# Patient Record
Sex: FEMALE | Race: BLACK OR AFRICAN AMERICAN | NOT HISPANIC OR LATINO | Employment: FULL TIME | URBAN - METROPOLITAN AREA
[De-identification: names, ages, dates, MRNs, and addresses within clinical notes are randomized per-mention and may not be internally consistent; named-entity substitution may affect disease eponyms.]

---

## 2023-03-15 DIAGNOSIS — I10 PRIMARY HYPERTENSION: Primary | ICD-10-CM

## 2023-03-15 RX ORDER — LISINOPRIL 10 MG/1
TABLET ORAL
Qty: 30 TABLET | Refills: 0 | Status: SHIPPED | OUTPATIENT
Start: 2023-03-15 | End: 2023-07-18 | Stop reason: SDUPTHER

## 2023-03-15 RX ORDER — HYDROCHLOROTHIAZIDE 12.5 MG/1
TABLET ORAL
Qty: 30 TABLET | Refills: 0 | Status: SHIPPED | OUTPATIENT
Start: 2023-03-15 | End: 2023-07-18 | Stop reason: SDUPTHER

## 2023-07-13 ENCOUNTER — APPOINTMENT (OUTPATIENT)
Dept: PRIMARY CARE | Facility: CLINIC | Age: 53
End: 2023-07-13
Payer: COMMERCIAL

## 2023-07-18 ENCOUNTER — OFFICE VISIT (OUTPATIENT)
Dept: PRIMARY CARE | Facility: CLINIC | Age: 53
End: 2023-07-18
Payer: COMMERCIAL

## 2023-07-18 VITALS
TEMPERATURE: 98.1 F | OXYGEN SATURATION: 98 % | HEART RATE: 64 BPM | DIASTOLIC BLOOD PRESSURE: 98 MMHG | SYSTOLIC BLOOD PRESSURE: 160 MMHG | WEIGHT: 119 LBS | RESPIRATION RATE: 17 BRPM | BODY MASS INDEX: 21.77 KG/M2

## 2023-07-18 DIAGNOSIS — I10 PRIMARY HYPERTENSION: ICD-10-CM

## 2023-07-18 DIAGNOSIS — E78.49 OTHER HYPERLIPIDEMIA: Primary | ICD-10-CM

## 2023-07-18 LAB
ALANINE AMINOTRANSFERASE (SGPT) (U/L) IN SER/PLAS: 11 U/L (ref 7–45)
ALBUMIN (G/DL) IN SER/PLAS: 4.6 G/DL (ref 3.4–5)
ALKALINE PHOSPHATASE (U/L) IN SER/PLAS: 77 U/L (ref 33–110)
ANION GAP IN SER/PLAS: 11 MMOL/L (ref 10–20)
ASPARTATE AMINOTRANSFERASE (SGOT) (U/L) IN SER/PLAS: 19 U/L (ref 9–39)
BILIRUBIN TOTAL (MG/DL) IN SER/PLAS: 0.7 MG/DL (ref 0–1.2)
CALCIUM (MG/DL) IN SER/PLAS: 9.9 MG/DL (ref 8.6–10.6)
CARBON DIOXIDE, TOTAL (MMOL/L) IN SER/PLAS: 32 MMOL/L (ref 21–32)
CHLORIDE (MMOL/L) IN SER/PLAS: 104 MMOL/L (ref 98–107)
CHOLESTEROL (MG/DL) IN SER/PLAS: 211 MG/DL (ref 0–199)
CHOLESTEROL IN HDL (MG/DL) IN SER/PLAS: 99.9 MG/DL
CHOLESTEROL/HDL RATIO: 2.1
CREATININE (MG/DL) IN SER/PLAS: 0.74 MG/DL (ref 0.5–1.05)
ERYTHROCYTE DISTRIBUTION WIDTH (RATIO) BY AUTOMATED COUNT: 14.2 % (ref 11.5–14.5)
ERYTHROCYTE MEAN CORPUSCULAR HEMOGLOBIN CONCENTRATION (G/DL) BY AUTOMATED: 31.8 G/DL (ref 32–36)
ERYTHROCYTE MEAN CORPUSCULAR VOLUME (FL) BY AUTOMATED COUNT: 84 FL (ref 80–100)
ERYTHROCYTES (10*6/UL) IN BLOOD BY AUTOMATED COUNT: 5.07 X10E12/L (ref 4–5.2)
GFR FEMALE: >90 ML/MIN/1.73M2
GLUCOSE (MG/DL) IN SER/PLAS: 89 MG/DL (ref 74–99)
HEMATOCRIT (%) IN BLOOD BY AUTOMATED COUNT: 42.8 % (ref 36–46)
HEMOGLOBIN (G/DL) IN BLOOD: 13.6 G/DL (ref 12–16)
LDL: 97 MG/DL (ref 0–99)
LEUKOCYTES (10*3/UL) IN BLOOD BY AUTOMATED COUNT: 5.8 X10E9/L (ref 4.4–11.3)
NRBC (PER 100 WBCS) BY AUTOMATED COUNT: 0 /100 WBC (ref 0–0)
PLATELETS (10*3/UL) IN BLOOD AUTOMATED COUNT: 326 X10E9/L (ref 150–450)
POTASSIUM (MMOL/L) IN SER/PLAS: 4.1 MMOL/L (ref 3.5–5.3)
PROTEIN TOTAL: 7 G/DL (ref 6.4–8.2)
SODIUM (MMOL/L) IN SER/PLAS: 143 MMOL/L (ref 136–145)
THYROTROPIN (MIU/L) IN SER/PLAS BY DETECTION LIMIT <= 0.05 MIU/L: 0.96 MIU/L (ref 0.44–3.98)
TRIGLYCERIDE (MG/DL) IN SER/PLAS: 71 MG/DL (ref 0–149)
UREA NITROGEN (MG/DL) IN SER/PLAS: 9 MG/DL (ref 6–23)
VLDL: 14 MG/DL (ref 0–40)

## 2023-07-18 PROCEDURE — 85027 COMPLETE CBC AUTOMATED: CPT

## 2023-07-18 PROCEDURE — 80053 COMPREHEN METABOLIC PANEL: CPT

## 2023-07-18 PROCEDURE — 99214 OFFICE O/P EST MOD 30 MIN: CPT | Performed by: STUDENT IN AN ORGANIZED HEALTH CARE EDUCATION/TRAINING PROGRAM

## 2023-07-18 PROCEDURE — 3080F DIAST BP >= 90 MM HG: CPT | Performed by: STUDENT IN AN ORGANIZED HEALTH CARE EDUCATION/TRAINING PROGRAM

## 2023-07-18 PROCEDURE — 3077F SYST BP >= 140 MM HG: CPT | Performed by: STUDENT IN AN ORGANIZED HEALTH CARE EDUCATION/TRAINING PROGRAM

## 2023-07-18 PROCEDURE — 80061 LIPID PANEL: CPT

## 2023-07-18 PROCEDURE — 84443 ASSAY THYROID STIM HORMONE: CPT

## 2023-07-18 RX ORDER — HYDROCHLOROTHIAZIDE 12.5 MG/1
12.5 TABLET ORAL DAILY
Qty: 30 TABLET | Refills: 2 | Status: SHIPPED | OUTPATIENT
Start: 2023-07-18 | End: 2023-08-11 | Stop reason: SDUPTHER

## 2023-07-18 RX ORDER — LISINOPRIL 10 MG/1
10 TABLET ORAL DAILY
Qty: 30 TABLET | Refills: 2 | Status: SHIPPED | OUTPATIENT
Start: 2023-07-18 | End: 2023-08-11

## 2023-07-18 ASSESSMENT — ENCOUNTER SYMPTOMS: DEPRESSION: 0

## 2023-07-18 NOTE — PROGRESS NOTES
Subjective   Patient ID: Trena Yee is a 53 y.o. female who presents for Hypertension.  CATALINA Yee is here for a blood pressure check.  Reports no concerns.  Reports has not taken her medications.  Denies any complaints or concerns pertaining to cardiovascular system  Past Medical History:   Diagnosis Date    Personal history of other diseases of the nervous system and sense organs     History of sleep apnea      Past Surgical History:   Procedure Laterality Date    MOUTH SURGERY  05/24/2016    Oral Surgery Tooth Extraction      No family history on file.   No Known Allergies       Occupation:     Review of Systems   Constitutional:  Negative for activity change and fever.   HENT:  Negative for congestion.    Respiratory:  Negative for cough, shortness of breath and wheezing.    Cardiovascular:  Negative for chest pain and leg swelling.   Gastrointestinal:  Negative for abdominal pain, constipation, nausea and vomiting.   Endocrine: Negative for cold intolerance.   Genitourinary:  Negative for dysuria, hematuria and urgency.   Neurological:  Negative for dizziness, speech difficulty, weakness and numbness.   Psychiatric/Behavioral:  Negative for self-injury and suicidal ideas.        Objective   Visit Vitals  BP (!) 160/98   Pulse 64   Temp 36.7 °C (98.1 °F)   Resp 17   Wt 54 kg (119 lb)   SpO2 98%   BMI 21.77 kg/m²   Smoking Status Never   BSA 1.54 m²      Physical Exam  Constitutional:       Appearance: Normal appearance.   HENT:      Head: Normocephalic and atraumatic.      Nose: Nose normal.      Mouth/Throat:      Mouth: Mucous membranes are moist.   Eyes:      Conjunctiva/sclera: Conjunctivae normal.      Pupils: Pupils are equal, round, and reactive to light.   Cardiovascular:      Rate and Rhythm: Normal rate and regular rhythm.      Pulses: Normal pulses.      Heart sounds: Normal heart sounds.   Pulmonary:      Effort: Pulmonary effort is normal.      Breath sounds: Normal breath sounds.    Musculoskeletal:         General: Normal range of motion.      Cervical back: Neck supple.   Skin:     General: Skin is warm.   Neurological:      General: No focal deficit present.      Mental Status: She is alert and oriented to person, place, and time. Mental status is at baseline.      Cranial Nerves: No cranial nerve deficit.      Sensory: No sensory deficit.      Motor: No weakness.      Coordination: Coordination normal.      Gait: Gait normal.      Deep Tendon Reflexes: Reflexes normal.   Psychiatric:         Mood and Affect: Mood normal.         Behavior: Behavior normal.         Thought Content: Thought content normal.         Judgment: Judgment normal.         Assessment/Plan          Problem List Items Addressed This Visit       Other hyperlipidemia - Primary     Other Visit Diagnoses       Primary hypertension        Relevant Medications    hydroCHLOROthiazide (HYDRODiuril) 12.5 mg tablet    lisinopril 10 mg tablet    Other Relevant Orders    CBC (Completed)    Lipid Panel (Completed)    Comprehensive Metabolic Panel (Completed)    TSH with reflex to Free T4 if abnormal (Completed)        Blood pressure elevated today.  To restart lisinopril 10 mg daily and hydrochlorothiazide 12.5 mg daily.  Lifestyle modification including low-salt diet discussed.  Verbalizes understanding.  Home blood pressure monitoring advised.  Patient to proceed to the emergency if she has any symptoms pertaining to cardiovascular system.  We will update her labs including lipid panel.

## 2023-07-20 ENCOUNTER — OFFICE VISIT (OUTPATIENT)
Dept: PRIMARY CARE | Facility: CLINIC | Age: 53
End: 2023-07-20
Payer: COMMERCIAL

## 2023-07-20 VITALS
DIASTOLIC BLOOD PRESSURE: 94 MMHG | OXYGEN SATURATION: 99 % | SYSTOLIC BLOOD PRESSURE: 159 MMHG | HEART RATE: 60 BPM | RESPIRATION RATE: 17 BRPM | TEMPERATURE: 98 F | WEIGHT: 119 LBS | BODY MASS INDEX: 21.77 KG/M2

## 2023-07-20 DIAGNOSIS — I10 PRIMARY HYPERTENSION: ICD-10-CM

## 2023-07-20 DIAGNOSIS — E78.49 OTHER HYPERLIPIDEMIA: Primary | ICD-10-CM

## 2023-07-20 PROCEDURE — 3077F SYST BP >= 140 MM HG: CPT | Performed by: STUDENT IN AN ORGANIZED HEALTH CARE EDUCATION/TRAINING PROGRAM

## 2023-07-20 PROCEDURE — 3080F DIAST BP >= 90 MM HG: CPT | Performed by: STUDENT IN AN ORGANIZED HEALTH CARE EDUCATION/TRAINING PROGRAM

## 2023-07-20 PROCEDURE — 99214 OFFICE O/P EST MOD 30 MIN: CPT | Performed by: STUDENT IN AN ORGANIZED HEALTH CARE EDUCATION/TRAINING PROGRAM

## 2023-07-20 ASSESSMENT — ENCOUNTER SYMPTOMS
DYSURIA: 0
DEPRESSION: 0
ABDOMINAL PAIN: 0
COUGH: 0
VOMITING: 0
NUMBNESS: 0
CONSTIPATION: 0
DIZZINESS: 0
WEAKNESS: 0
SPEECH DIFFICULTY: 0
ACTIVITY CHANGE: 0
FEVER: 0
SHORTNESS OF BREATH: 0
WHEEZING: 0
NAUSEA: 0
HEMATURIA: 0

## 2023-07-20 NOTE — PROGRESS NOTES
Subjective   Patient ID: Trena Yee is a 53 y.o. female who presents for Hypertension.  HPI  For follow up in Hypertension   No complaints   Plans to move to [facility] but waiting on BP control   Labs discussed    Past Medical History:   Diagnosis Date    Personal history of other diseases of the nervous system and sense organs     History of sleep apnea      Past Surgical History:   Procedure Laterality Date    MOUTH SURGERY  05/24/2016    Oral Surgery Tooth Extraction        No Known Allergies           Review of Systems   Constitutional:  Negative for activity change and fever.   HENT:  Negative for congestion.    Respiratory:  Negative for cough, shortness of breath and wheezing.    Cardiovascular:  Negative for chest pain and leg swelling.   Gastrointestinal:  Negative for abdominal pain, constipation, nausea and vomiting.   Endocrine: Negative for cold intolerance.   Genitourinary:  Negative for dysuria, hematuria and urgency.   Neurological:  Negative for dizziness, speech difficulty, weakness and numbness.   Psychiatric/Behavioral:  Negative for self-injury and suicidal ideas.        Objective   Visit Vitals  BP (!) 159/94 (BP Location: Left arm)   Pulse 60   Temp 36.7 °C (98 °F)   Resp 17   Wt 54 kg (119 lb)   SpO2 99%   BMI 21.77 kg/m²   Smoking Status Never   BSA 1.54 m²      Physical Exam  Constitutional:       Appearance: Normal appearance.   HENT:      Head: Normocephalic and atraumatic.      Nose: Nose normal.      Mouth/Throat:      Mouth: Mucous membranes are moist.   Eyes:      Conjunctiva/sclera: Conjunctivae normal.      Pupils: Pupils are equal, round, and reactive to light.   Cardiovascular:      Rate and Rhythm: Normal rate and regular rhythm.      Pulses: Normal pulses.      Heart sounds: Normal heart sounds.   Pulmonary:      Effort: Pulmonary effort is normal.      Breath sounds: Normal breath sounds.   Musculoskeletal:         General: Normal range of motion.      Cervical back: Neck  supple.   Skin:     General: Skin is warm.   Neurological:      General: No focal deficit present.      Mental Status: She is alert and oriented to person, place, and time.   Psychiatric:         Mood and Affect: Mood normal.         Behavior: Behavior normal.         Thought Content: Thought content normal.         Judgment: Judgment normal.         Assessment/Plan     Problem List Items Addressed This Visit       Hypertension    Other hyperlipidemia - Primary    BP not at goal;    Inc lisinopril to 20   Continue hydrochlorothiazide 12.5   Home BP monitoring     HLD:  The 10-year ASCVD risk score (Fabienne HASTINGS, et al., 2019) is: 5.1%    Values used to calculate the score:      Age: 53 years      Sex: Female      Is Non- : Yes      Diabetic: No      Tobacco smoker: No      Systolic Blood Pressure: 159 mmHg      Is BP treated: Yes      HDL Cholesterol: 99.9 mg/dL      Total Cholesterol: 211 mg/dL   No indication of statin at this time  To continue lifestyle modificatiosn     Follow up in 1 week   Plans to move to [place]

## 2023-07-31 ASSESSMENT — ENCOUNTER SYMPTOMS
SPEECH DIFFICULTY: 0
CONSTIPATION: 0
SHORTNESS OF BREATH: 0
WHEEZING: 0
DIZZINESS: 0
ACTIVITY CHANGE: 0
COUGH: 0
WEAKNESS: 0
DYSURIA: 0
FEVER: 0
ABDOMINAL PAIN: 0
NUMBNESS: 0
NAUSEA: 0
VOMITING: 0
HEMATURIA: 0

## 2023-08-02 ENCOUNTER — APPOINTMENT (OUTPATIENT)
Dept: PRIMARY CARE | Facility: CLINIC | Age: 53
End: 2023-08-02
Payer: COMMERCIAL

## 2023-08-08 ENCOUNTER — APPOINTMENT (OUTPATIENT)
Dept: PRIMARY CARE | Facility: CLINIC | Age: 53
End: 2023-08-08
Payer: COMMERCIAL

## 2023-08-11 ENCOUNTER — OFFICE VISIT (OUTPATIENT)
Dept: PRIMARY CARE | Facility: CLINIC | Age: 53
End: 2023-08-11
Payer: COMMERCIAL

## 2023-08-11 VITALS
HEART RATE: 49 BPM | RESPIRATION RATE: 17 BRPM | SYSTOLIC BLOOD PRESSURE: 147 MMHG | DIASTOLIC BLOOD PRESSURE: 84 MMHG | TEMPERATURE: 98.1 F | OXYGEN SATURATION: 100 %

## 2023-08-11 DIAGNOSIS — G47.33 OBSTRUCTIVE SLEEP APNEA: ICD-10-CM

## 2023-08-11 DIAGNOSIS — I10 PRIMARY HYPERTENSION: Primary | ICD-10-CM

## 2023-08-11 DIAGNOSIS — I44.4 LEFT ANTERIOR FASCICULAR BLOCK: ICD-10-CM

## 2023-08-11 PROCEDURE — 99213 OFFICE O/P EST LOW 20 MIN: CPT | Performed by: STUDENT IN AN ORGANIZED HEALTH CARE EDUCATION/TRAINING PROGRAM

## 2023-08-11 PROCEDURE — 3077F SYST BP >= 140 MM HG: CPT | Performed by: STUDENT IN AN ORGANIZED HEALTH CARE EDUCATION/TRAINING PROGRAM

## 2023-08-11 PROCEDURE — 3079F DIAST BP 80-89 MM HG: CPT | Performed by: STUDENT IN AN ORGANIZED HEALTH CARE EDUCATION/TRAINING PROGRAM

## 2023-08-11 RX ORDER — HYDROCHLOROTHIAZIDE 12.5 MG/1
12.5 TABLET ORAL DAILY
Qty: 30 TABLET | Refills: 2 | Status: SHIPPED | OUTPATIENT
Start: 2023-08-11 | End: 2023-11-30 | Stop reason: WASHOUT

## 2023-08-11 RX ORDER — LISINOPRIL 30 MG/1
30 TABLET ORAL DAILY
Qty: 30 TABLET | Refills: 2 | Status: SHIPPED | OUTPATIENT
Start: 2023-08-11 | End: 2023-11-30 | Stop reason: WASHOUT

## 2023-08-11 ASSESSMENT — ENCOUNTER SYMPTOMS
COUGH: 0
DYSURIA: 0
WHEEZING: 0
FEVER: 0
ABDOMINAL PAIN: 0
SPEECH DIFFICULTY: 0
VOMITING: 0
WEAKNESS: 0
NAUSEA: 0
DEPRESSION: 0
HYPERTENSION: 1
CONSTIPATION: 0
ACTIVITY CHANGE: 0
DIZZINESS: 0
HEMATURIA: 0
NUMBNESS: 0
SHORTNESS OF BREATH: 0

## 2023-08-11 NOTE — PROGRESS NOTES
Subjective   Patient ID: Trena Yee is a 53 y.o. female who presents for Hypertension.  Hypertension  Pertinent negatives include no chest pain or shortness of breath.     For follow up on HTN   Reports to be complaint with meds   No concerns/ symptoms pertaining to Cardivascular system or neurological system endorsed         Past Medical History:   Diagnosis Date    Personal history of other diseases of the nervous system and sense organs     History of sleep apnea      Past Surgical History:   Procedure Laterality Date    MOUTH SURGERY  05/24/2016    Oral Surgery Tooth Extraction      No family history on file.   No Known Allergies       Occupation:     Review of Systems   Constitutional:  Negative for activity change and fever.   HENT:  Negative for congestion.    Respiratory:  Negative for cough, shortness of breath and wheezing.    Cardiovascular:  Negative for chest pain and leg swelling.   Gastrointestinal:  Negative for abdominal pain, constipation, nausea and vomiting.   Endocrine: Negative for cold intolerance.   Genitourinary:  Negative for dysuria, hematuria and urgency.   Neurological:  Negative for dizziness, speech difficulty, weakness and numbness.   Psychiatric/Behavioral:  Negative for self-injury and suicidal ideas.        Objective   Visit Vitals  /90   Pulse (!) 49   Temp 36.7 °C (98.1 °F)   Resp 17   SpO2 100%   Smoking Status Never      Physical Exam  Constitutional:       Appearance: Normal appearance.   HENT:      Head: Normocephalic and atraumatic.      Nose: Nose normal.      Mouth/Throat:      Mouth: Mucous membranes are moist.   Eyes:      Conjunctiva/sclera: Conjunctivae normal.      Pupils: Pupils are equal, round, and reactive to light.   Cardiovascular:      Rate and Rhythm: Normal rate and regular rhythm.      Pulses: Normal pulses.      Heart sounds: Normal heart sounds.   Pulmonary:      Effort: Pulmonary effort is normal.      Breath sounds: Normal breath sounds.    Musculoskeletal:         General: Normal range of motion.      Cervical back: Neck supple.   Skin:     General: Skin is warm.   Neurological:      General: No focal deficit present.      Mental Status: She is alert and oriented to person, place, and time.   Psychiatric:         Mood and Affect: Mood normal.         Behavior: Behavior normal.         Thought Content: Thought content normal.         Judgment: Judgment normal.       Assessment/Plan           Hypertension    Other hyperlipidemia - Primary    BP not at goal;    Inc lisinopril to 30   Continue hydrochlorothiazide 12.5   Home BP monitoring   In office EKG normal sinus rhythm with left anterior fascicular block.  This has been present in previous EKGs as well.  Patient denies any symptoms.  Advised patient to see cardiology and get an echocardiogram.  HLD:  The 10-year ASCVD risk score (Fabienne HASTINGS, et al., 2019) is: 5.1%    Values used to calculate the score:      Age: 53 years      Sex: Female      Is Non- : Yes      Diabetic: No      Tobacco smoker: No      Systolic Blood Pressure: 159 mmHg      Is BP treated: Yes      HDL Cholesterol: 99.9 mg/dL      Total Cholesterol: 211 mg/dL   No indication of statin at this time  To continue lifestyle modificatiosn      Problem List Items Addressed This Visit       Hypertension - Primary    Relevant Medications    lisinopril (ZestriL) 30 mg tablet    hydroCHLOROthiazide (HYDRODiuril) 12.5 mg tablet     Other Visit Diagnoses       Left anterior fascicular block        Relevant Orders    Transthoracic echo (TTE) complete    Referral to Cardiology    Obstructive sleep apnea        Relevant Orders    In-Center Sleep Study (Non-Sleep Provider Only)        Reports to have snoring along with daytime fatigue.  At advised to get in center sleep study for further evaluation.  Patient to follow-up with me in 3 months for hypertension and hyperlipidemia.  Advised to follow-up with cardiology for EKG  changes.  Patient to seek medical attention immediately or call 911 if she develops any chest pain or palpitation or shortness of breath or other symptoms.  She verbalizes understanding.   132

## 2023-09-06 LAB
ALBUMIN (G/DL) IN SER/PLAS: 4.4 G/DL (ref 3.4–5)
AMPHETAMINE (PRESENCE) IN URINE BY SCREEN METHOD: ABNORMAL
ANION GAP IN SER/PLAS: 12 MMOL/L (ref 10–20)
BARBITURATES PRESENCE IN URINE BY SCREEN METHOD: ABNORMAL
BENZODIAZEPINE (PRESENCE) IN URINE BY SCREEN METHOD: ABNORMAL
CALCIUM (MG/DL) IN SER/PLAS: 9.7 MG/DL (ref 8.6–10.3)
CANNABINOIDS IN URINE BY SCREEN METHOD: ABNORMAL
CARBON DIOXIDE, TOTAL (MMOL/L) IN SER/PLAS: 29 MMOL/L (ref 21–32)
CHLORIDE (MMOL/L) IN SER/PLAS: 103 MMOL/L (ref 98–107)
CHOLESTEROL (MG/DL) IN SER/PLAS: 233 MG/DL (ref 0–199)
CHOLESTEROL IN HDL (MG/DL) IN SER/PLAS: 99 MG/DL
CHOLESTEROL IN LDL (MG/DL) IN SER/PLAS BY DIRECT ASSAY: 104 MG/DL (ref 0–129)
CHOLESTEROL/HDL RATIO: 2.4
COCAINE (PRESENCE) IN URINE BY SCREEN METHOD: ABNORMAL
CREATININE (MG/DL) IN SER/PLAS: 0.82 MG/DL (ref 0.5–1.05)
DRUG SCREEN COMMENT URINE: ABNORMAL
FENTANYL URINE: ABNORMAL
GFR FEMALE: 85 ML/MIN/1.73M2
GLUCOSE (MG/DL) IN SER/PLAS: 75 MG/DL (ref 74–99)
MAGNESIUM (MG/DL) IN SER/PLAS: 1.97 MG/DL (ref 1.6–2.4)
NATRIURETIC PEPTIDE B (PG/ML) IN SER/PLAS: 32 PG/ML (ref 0–99)
NON-HDL CHOLESTEROL: 134 MG/DL
OPIATES (PRESENCE) IN URINE BY SCREEN METHOD: ABNORMAL
OXYCODONE (PRESENCE) IN URINE BY SCREEN METHOD: ABNORMAL
PHENCYCLIDINE (PRESENCE) IN URINE BY SCREEN METHOD: ABNORMAL
PHOSPHATE (MG/DL) IN SER/PLAS: 3.7 MG/DL (ref 2.5–4.9)
POTASSIUM (MMOL/L) IN SER/PLAS: 3.5 MMOL/L (ref 3.5–5.3)
SODIUM (MMOL/L) IN SER/PLAS: 140 MMOL/L (ref 136–145)
THYROTROPIN (MIU/L) IN SER/PLAS BY DETECTION LIMIT <= 0.05 MIU/L: 1.41 MIU/L (ref 0.44–3.98)
THYROXINE (T4) FREE (NG/DL) IN SER/PLAS: 0.86 NG/DL (ref 0.61–1.12)
TRIGLYCERIDE (MG/DL) IN SER/PLAS: 84 MG/DL (ref 0–149)
TROPONIN I, HIGH SENSITIVITY: 5 NG/L (ref 0–13)
UREA NITROGEN (MG/DL) IN SER/PLAS: 18 MG/DL (ref 6–23)

## 2023-09-09 LAB — 11-NOR-9-CARBOXY-THC, URN, QUANT: 452 NG/ML

## 2023-10-28 PROBLEM — J32.9 CHRONIC SINUSITIS: Status: ACTIVE | Noted: 2023-10-28

## 2023-10-28 PROBLEM — G47.33 OBSTRUCTIVE SLEEP APNEA: Status: ACTIVE | Noted: 2023-10-28

## 2023-10-28 PROBLEM — N85.8 UTERINE MASS: Status: ACTIVE | Noted: 2023-10-28

## 2023-10-28 PROBLEM — M54.41 LOW BACK PAIN WITH RIGHT-SIDED SCIATICA: Status: ACTIVE | Noted: 2023-10-28

## 2023-10-28 PROBLEM — N60.02 BREAST CYST, LEFT: Status: ACTIVE | Noted: 2023-10-28

## 2023-10-28 PROBLEM — F12.10 MARIJUANA ABUSE: Status: ACTIVE | Noted: 2023-10-28

## 2023-10-28 PROBLEM — N91.2 AMENORRHEA: Status: ACTIVE | Noted: 2023-10-28

## 2023-10-28 PROBLEM — E78.5 BORDERLINE HYPERLIPIDEMIA: Status: ACTIVE | Noted: 2023-07-20

## 2023-10-28 PROBLEM — R92.8 ABNORMAL MAMMOGRAM: Status: ACTIVE | Noted: 2023-10-28

## 2023-10-28 PROBLEM — R22.31 MASS OF FINGER OF RIGHT HAND: Status: ACTIVE | Noted: 2023-10-28

## 2023-10-28 PROBLEM — R94.31 ABNORMAL EKG: Status: ACTIVE | Noted: 2023-10-28

## 2023-10-28 RX ORDER — IBUPROFEN 600 MG/1
600 TABLET ORAL EVERY 6 HOURS PRN
COMMUNITY
Start: 2018-10-23 | End: 2023-11-30 | Stop reason: WASHOUT

## 2023-10-28 RX ORDER — LYSINE HCL 500 MG
1 TABLET ORAL 2 TIMES DAILY
COMMUNITY
Start: 2016-06-23 | End: 2023-11-30 | Stop reason: WASHOUT

## 2023-10-28 RX ORDER — HYDROCHLOROTHIAZIDE 25 MG/1
1 TABLET ORAL DAILY
COMMUNITY
End: 2023-11-30 | Stop reason: SDUPTHER

## 2023-10-28 RX ORDER — LISINOPRIL 10 MG/1
1 TABLET ORAL DAILY
COMMUNITY
Start: 2022-07-13 | End: 2023-11-30 | Stop reason: WASHOUT

## 2023-10-28 RX ORDER — FLUTICASONE PROPIONATE 50 MCG
1 SPRAY, SUSPENSION (ML) NASAL EVERY 12 HOURS
COMMUNITY
Start: 2022-08-16

## 2023-10-28 RX ORDER — OXYCODONE AND ACETAMINOPHEN 5; 325 MG/1; MG/1
1 TABLET ORAL EVERY 6 HOURS PRN
COMMUNITY
Start: 2018-10-23 | End: 2023-11-30 | Stop reason: WASHOUT

## 2023-10-28 RX ORDER — AMLODIPINE BESYLATE 2.5 MG/1
2.5 TABLET ORAL DAILY
COMMUNITY
End: 2023-11-30 | Stop reason: SDUPTHER

## 2023-10-28 RX ORDER — POLYETHYLENE GLYCOL 3350 17 G/17G
17 POWDER, FOR SOLUTION ORAL DAILY
COMMUNITY
Start: 2018-10-23 | End: 2023-11-30 | Stop reason: WASHOUT

## 2023-10-29 ASSESSMENT — SLEEP AND FATIGUE QUESTIONNAIRES
HOW LIKELY ARE YOU TO NOD OFF OR FALL ASLEEP WHILE LYING DOWN TO REST IN THE AFTERNOON WHEN CIRCUMSTANCES PERMIT: MODERATE CHANCE OF DOZING
ESS TOTAL SCORE: 6
HOW LIKELY ARE YOU TO NOD OFF OR FALL ASLEEP WHILE SITTING QUIETLY AFTER LUNCH WITHOUT ALCOHOL: SLIGHT CHANCE OF DOZING
HOW LIKELY ARE YOU TO NOD OFF OR FALL ASLEEP WHEN YOU ARE A PASSENGER IN A CAR FOR AN HOUR WITHOUT A BREAK: SLIGHT CHANCE OF DOZING
HOW LIKELY ARE YOU TO NOD OFF OR FALL ASLEEP WHILE WATCHING TV: SLIGHT CHANCE OF DOZING
HOW LIKELY ARE YOU TO NOD OFF OR FALL ASLEEP WHILE SITTING AND TALKING TO SOMEONE: NO CHANCE OF DOZING
HOW LIKELY ARE YOU TO NOD OFF OR FALL ASLEEP IN A CAR, WHILE STOPPED FOR A FEW MINUTES IN TRAFFIC: NO CHANCE OF DOZING
SITING INACTIVE IN A PUBLIC PLACE LIKE A CLASS ROOM OR A MOVIE THEATER: NO CHANCE OF DOZING
HOW LIKELY ARE YOU TO NOD OFF OR FALL ASLEEP WHILE SITTING AND READING: SLIGHT CHANCE OF DOZING

## 2023-10-30 ENCOUNTER — APPOINTMENT (OUTPATIENT)
Dept: CARDIOLOGY | Facility: CLINIC | Age: 53
End: 2023-10-30
Payer: COMMERCIAL

## 2023-11-01 ENCOUNTER — CLINICAL SUPPORT (OUTPATIENT)
Dept: SLEEP MEDICINE | Facility: CLINIC | Age: 53
End: 2023-11-01
Payer: COMMERCIAL

## 2023-11-01 DIAGNOSIS — G47.33 OBSTRUCTIVE SLEEP APNEA: ICD-10-CM

## 2023-11-01 PROCEDURE — 95810 POLYSOM 6/> YRS 4/> PARAM: CPT | Performed by: INTERNAL MEDICINE

## 2023-11-02 VITALS
WEIGHT: 125.44 LBS | BODY MASS INDEX: 23.68 KG/M2 | SYSTOLIC BLOOD PRESSURE: 142 MMHG | DIASTOLIC BLOOD PRESSURE: 85 MMHG | HEIGHT: 61 IN

## 2023-11-02 NOTE — PROGRESS NOTES
[facility] TECH NOTE:     Patient: Trena Yee   MRN//AGE: [id number]  1970  53 y.o.   Technologist: Farrah Umana   Room: 440 A   Service Date: 2023        Sleep Testing Location: [facility]    [place]:     TECHNOLOGIST SLEEP STUDY PROCEDURE NOTE:   This sleep study is being conducted according to the policies and procedures outlined by the AAS accreditation standards.  The sleep study procedure and processes involved during this appointment was explained to the patient/patient’s family, questions were answered. The patient/family verbalized understanding.      The patient is a 53 y.o. year old female scheduled for aDiagnostic PSG Split night with montage of:  PSG . she arrived for her appointment.      The study that was ultimately completed was a PSG  with montage of:  Diagnostic PSG .    The full study Was completed.  Patient questionnaires completed?: yes     Consents signed? yes    Initial Fall Risk Screening:     Trena has not fallen in the last 6 months. her did not result in injury. Trena does not have a fear of falling. He does not need assistance with sitting, standing, or walking. she does not need assistance walking in her home. she does not need assistance in an unfamiliar setting. The patient is notusing an assistive device.     Brief Study observations: PSG     Deviation to order/protocol and reason: no      If PAP, which was preferred mask/pressure/mode: no      Other:None    After the procedure, the patient/family was informed to ensure followup with ordering clinician for testing results.      Technologist: Farrah Umana

## 2023-11-17 ENCOUNTER — APPOINTMENT (OUTPATIENT)
Dept: CARDIOLOGY | Facility: CLINIC | Age: 53
End: 2023-11-17
Payer: COMMERCIAL

## 2023-11-30 ENCOUNTER — OFFICE VISIT (OUTPATIENT)
Dept: CARDIOLOGY | Facility: CLINIC | Age: 53
End: 2023-11-30
Payer: COMMERCIAL

## 2023-11-30 VITALS
HEIGHT: 62 IN | HEART RATE: 57 BPM | BODY MASS INDEX: 23.08 KG/M2 | WEIGHT: 125.4 LBS | SYSTOLIC BLOOD PRESSURE: 159 MMHG | OXYGEN SATURATION: 99 % | DIASTOLIC BLOOD PRESSURE: 92 MMHG

## 2023-11-30 DIAGNOSIS — R94.31 ABNORMAL EKG: ICD-10-CM

## 2023-11-30 DIAGNOSIS — G47.33 MILD OBSTRUCTIVE SLEEP APNEA: Primary | ICD-10-CM

## 2023-11-30 DIAGNOSIS — E78.5 BORDERLINE HYPERLIPIDEMIA: ICD-10-CM

## 2023-11-30 DIAGNOSIS — I10 PRIMARY HYPERTENSION: ICD-10-CM

## 2023-11-30 LAB
ATRIAL RATE: 57 BPM
P AXIS: 34 DEGREES
P OFFSET: 205 MS
P ONSET: 153 MS
PR INTERVAL: 152 MS
Q ONSET: 229 MS
QRS COUNT: 10 BEATS
QRS DURATION: 94 MS
QT INTERVAL: 468 MS
QTC CALCULATION(BAZETT): 455 MS
QTC FREDERICIA: 460 MS
R AXIS: -31 DEGREES
T AXIS: -13 DEGREES
T OFFSET: 463 MS
VENTRICULAR RATE: 57 BPM

## 2023-11-30 PROCEDURE — 3077F SYST BP >= 140 MM HG: CPT | Performed by: INTERNAL MEDICINE

## 2023-11-30 PROCEDURE — 93010 ELECTROCARDIOGRAM REPORT: CPT | Performed by: INTERNAL MEDICINE

## 2023-11-30 PROCEDURE — 99204 OFFICE O/P NEW MOD 45 MIN: CPT | Performed by: INTERNAL MEDICINE

## 2023-11-30 PROCEDURE — 3080F DIAST BP >= 90 MM HG: CPT | Performed by: INTERNAL MEDICINE

## 2023-11-30 PROCEDURE — 1036F TOBACCO NON-USER: CPT | Performed by: INTERNAL MEDICINE

## 2023-11-30 PROCEDURE — 99214 OFFICE O/P EST MOD 30 MIN: CPT | Performed by: INTERNAL MEDICINE

## 2023-11-30 PROCEDURE — 93005 ELECTROCARDIOGRAM TRACING: CPT | Performed by: INTERNAL MEDICINE

## 2023-11-30 RX ORDER — HYDROCHLOROTHIAZIDE 25 MG/1
25 TABLET ORAL DAILY
Qty: 90 TABLET | Refills: 3 | Status: SHIPPED | OUTPATIENT
Start: 2023-11-30 | End: 2023-12-28 | Stop reason: SDUPTHER

## 2023-11-30 RX ORDER — LISINOPRIL 20 MG/1
20 TABLET ORAL DAILY
COMMUNITY
End: 2023-11-30 | Stop reason: SDUPTHER

## 2023-11-30 RX ORDER — LISINOPRIL 30 MG/1
30 TABLET ORAL DAILY
Qty: 90 TABLET | Refills: 3 | Status: SHIPPED | OUTPATIENT
Start: 2023-11-30 | End: 2023-12-28 | Stop reason: SDUPTHER

## 2023-11-30 RX ORDER — AMLODIPINE BESYLATE 10 MG/1
10 TABLET ORAL DAILY
Qty: 90 TABLET | Refills: 3 | Status: SHIPPED | OUTPATIENT
Start: 2023-11-30 | End: 2023-12-28 | Stop reason: SDUPTHER

## 2023-11-30 ASSESSMENT — PAIN SCALES - GENERAL: PAINLEVEL: 0-NO PAIN

## 2023-11-30 ASSESSMENT — ENCOUNTER SYMPTOMS
DEPRESSION: 0
LOSS OF SENSATION IN FEET: 0
OCCASIONAL FEELINGS OF UNSTEADINESS: 0

## 2023-11-30 NOTE — LETTER
November 30, 2023     Patient: Trena Yee   YOB: 1970   Date of Visit: 11/30/2023       To Whom It May Concern:    Trena Yee was seen in my clinic on 11/30/2023. Please excuse Trena for her absence from work on this day to make the appointment.    If you have any questions or concerns, please don't hesitate to call.         Sincerely,         Bernabe Vargas,         CC: No Recipients

## 2023-11-30 NOTE — PROGRESS NOTES
"Chief Complaint:   Follow-up (7-9 week follow up)     History Of Present Illness:    Trena Yee is a 53 y.o. female presenting with  a pertinent medical history notable for poorly controlled essential hypertension, obstructive sleep apnea, dyslipidemia who was referred to cardiology for abnormal EKG in the setting of left anterior fascicular block      11/30/2023 -- She presents for follow up. She has been trying to follow th e dash diet and is adjusting . She otherwise remains stable. She offers no complaints.         Patient denies chest pain and angina. Pt denies shortness of breath, dyspnea on exertion, orthopnea, and paroxysmal nocturnal dyspnea. Pt denies worsening lower extremity edema. Pt denies palpitations or syncope. No recent falls. No fever or chills. No cough. No change in bowel or bladder habits. No travel. No sick contacts. No recent travel     12 point review of systems was performed and is otherwise negative.  Past medical history: As above.  Medications: Reviewed.  Allergies: Reviewed.  Social history: Patient denies smoking, alcohol abuse, or illicit drug use. She does admit to continued marijuana use  Family history: No sudden cardiac death or premature coronary artery disease..     Last Recorded Vitals:  Vitals:    11/30/23 1009   BP: (!) 159/92   Patient Position: Sitting   Pulse: 57   SpO2: 99%   Weight: 56.9 kg (125 lb 6.4 oz)   Height: 1.575 m (5' 2\")       Past Medical History:  She has a past medical history of Personal history of other diseases of the nervous system and sense organs.    Past Surgical History:  She has a past surgical history that includes Mouth surgery (05/24/2016).      Social History:  She reports that she has never smoked. She has never used smokeless tobacco. She reports current drug use. Drug: Marijuana. No history on file for alcohol use.    Family History:  Family History   Problem Relation Name Age of Onset    No Known Problems Mother      Diabetes Father  "     Sleep apnea Son      Diabetes Maternal Grandmother      Diabetes Paternal Grandmother      ALS Paternal Grandmother      Prostate cancer Paternal Grandfather          Allergies:  Patient has no known allergies.    Outpatient Medications:  Current Outpatient Medications   Medication Instructions    amLODIPine (NORVASC) 2.5 mg, oral, Daily, As Needed if systolic >160    fluticasone (Flonase) 50 mcg/actuation nasal spray 1 spray, Each Nostril, Every 12 hours    hydroCHLOROthiazide (HYDRODiuril) 25 mg tablet 1 tablet, oral, Daily    lisinopril 20 mg, oral, Daily    multivitamin-Ca-iron-minerals (Tab-A-Charu Womens) 27-0.4 mg tablet 1 tablet, oral, Daily       Physical Exam:  Vitals and nursing note reviewed.   Constitutional:       Appearance: Healthy appearance.   Eyes:      Pupils: Pupils are equal, round, and reactive to light.   HENT:    Mouth/Throat:      Pharynx: Oropharynx is clear.   Neck:      Vascular: JVD normal.      Lymphadenopathy: No cervical adenopathy.   Pulmonary:      Effort: Pulmonary effort is normal.      Breath sounds: Normal breath sounds. No wheezing. No rhonchi.   Cardiovascular:      Normal rate. Regular rhythm. Normal S1. Normal S2.       Murmurs: There is no murmur.      No click.   Pulses:     Intact distal pulses.   Musculoskeletal:      Cervical back: Normal range of motion.          Last Labs:  CBC -  Lab Results   Component Value Date    WBC 5.8 07/18/2023    HGB 13.6 07/18/2023    HCT 42.8 07/18/2023    MCV 84 07/18/2023     07/18/2023       CMP -  Lab Results   Component Value Date    CALCIUM 9.7 09/06/2023    PHOS 3.7 09/06/2023    PROT 7.0 07/18/2023    ALBUMIN 4.4 09/06/2023    AST 19 07/18/2023    ALT 11 07/18/2023    ALKPHOS 77 07/18/2023    BILITOT 0.7 07/18/2023       LIPID PANEL -   Lab Results   Component Value Date    CHOL 233 (H) 09/06/2023    TRIG 84 09/06/2023    HDL 99.0 09/06/2023    CHHDL 2.4 09/06/2023    LDLF 97 07/18/2023    VLDL 14 07/18/2023       RENAL  "FUNCTION PANEL -   Lab Results   Component Value Date    GLUCOSE 75 09/06/2023     09/06/2023    K 3.5 09/06/2023     09/06/2023    CO2 29 09/06/2023    ANIONGAP 12 09/06/2023    BUN 18 09/06/2023    CREATININE 0.82 09/06/2023    CALCIUM 9.7 09/06/2023    PHOS 3.7 09/06/2023    ALBUMIN 4.4 09/06/2023        Lab Results   Component Value Date    BNP 32 09/06/2023    HGBA1C 5.6 10/13/2017       Last Cardiology Tests:  ECG:  In Office EKG 11/30/2023 -- Sinus Bradycardia     Echo: 08/31/2023  1. Left ventricular systolic function is normal with a 55-60% estimated ejection fraction.  2. Mild to moderate tricuspid regurgitation visualized.  3. RVSP within normal limits.      Lab review: I have personally reviewed the laboratory result(s)   Diagnostic review: I have personally reviewed the result(s) of the EKG and Echocardiogram .     Assessment/Plan   Problem List Items Addressed This Visit       Hypertension    Relevant Medications    hydroCHLOROthiazide (HYDRODiuril) 25 mg tablet    amLODIPine (Norvasc) 10 mg tablet    lisinopril 30 mg tablet    Other Relevant Orders    ECG 12 lead (Clinic Performed) (Completed)    Borderline hyperlipidemia    Overview     The 10-year ASCVD risk score (Fabienne HASTINGS, et al., 2019) is: 5.6%    Values used to calculate the score:      Age: 53 years      Sex: Female      Is Non- : Yes      Diabetic: No      Tobacco smoker: No      Systolic Blood Pressure: 159 mmHg      Is BP treated: Yes      HDL Cholesterol: 99 mg/dL      Total Cholesterol: 233 mg/dL  Lab Results   Component Value Date    CHOL 233 (H) 09/06/2023    CHOL 211 (H) 07/18/2023    CHOL 203 (H) 07/13/2022     Lab Results   Component Value Date    HDL 99.0 09/06/2023    HDL 99.9 07/18/2023    HDL 95.9 07/13/2022   No results found for: \"LDLCALC\"  Lab Results   Component Value Date    TRIG 84 09/06/2023    TRIG 71 07/18/2023    TRIG 142 07/13/2022   No components found for: \"CHOLHDL\"           " Current Assessment & Plan     Continue with lifestyle modification            Relevant Orders    ECG 12 lead (Clinic Performed) (Completed)    Abnormal EKG    Relevant Medications    hydroCHLOROthiazide (HYDRODiuril) 25 mg tablet    amLODIPine (Norvasc) 10 mg tablet    lisinopril 30 mg tablet    Other Relevant Orders    ECG 12 lead (Clinic Performed) (Completed)    Mild obstructive sleep apnea - Primary    Overview     Mild Sleep Apnea noted 11[facility]2023 Study   [facility]          Current Assessment & Plan     Decrease Alcohol / tobacco and Reacreational drug use  -- Sleep on left side                Bernabe Vargas, DO

## 2023-11-30 NOTE — PATIENT INSTRUCTIONS
"It was nice to meet you today. We will see each other again in 4-6 months     Your blood pressure regimen is as follows   ++ Please use hydrochlorothiazide  25 mg Daily   ++ Please use Lisinopril 30 mg daily   ++ Please use  Amlodipine 10 mg Daily      Please check your blood pressure 2-3 times daily. Please bring your readings and your dedvice to your next appointment.   Try to make sure that you are checking your blood pressure about 2 hours AFTER tyou take your medications.      PLEASE STOP SMOKING.. PERIOD.. FULL STOP    We talked about your Sleep Study revealing MILD SLEEP APNEA  -- You would get better sleep if you reduced you alcohol, tobacco and marijuana use.   -- By stopping this, this could get better.       Below are some Heart Health Tips that we provide to all of our patients. I hope you find them useful.      Please stop smoking.    --Try to cut back on the number of cigarettes that you smoke.   -- Try to increase the interval between cigarettes.   -- Try to use substitutes such as sugar-free candy carrots,celery sticks as in between.  -- Once you are down to less than half a pack a day try to go cold turkey.   -- Try to designate areas in the your home as smoke-free areas.   -- Try to reduce the number of ashtrays and other reminders of smoking.   -- Try to keep any major something that you dislike next to your cigarette pack to try to develop a mental aversion to smoking     -DASH stands for \"dietary approaches to stop hypertension.\" The DASH diet is low in total and saturated fat. It is rich in fruits, vegetables, and low-fat dairy foods. The diet allows you to get natural fiber, calcium, and magnesium from food. It prevents or lowers high blood pressure. It can also help lower cholesterol in your blood. Don't change how you eat all at once. It's much more likely that you'll succeed if you make only one or two small changes at a time. Wait until those changes are a habit, then make a couple more " changes. Some good starting steps include: Add one serving of vegetables to your meals at lunch and dinner. This is an easy way to help you get more vegetables in your diet. Have a piece of fruit as an afternoon or after-dinner snack. One glass of juice at breakfast is not enough fruit in your diet. Use half your usual amount of butter, margarine, or salad dressing. Buy nonfat salad dressing or nonfat sour cream. Follow this guide to select your menu of meals. The number of calories we want you to eat each day will tell you how many servings you can choose from each food group.     - We recommend you follow a heart healthy diet. Watch food labels and try not to eat more than 2,500 mg of sodium per day. Avoid foods high in salt like processed meats (lunch meats, hudson, and sausage), processed foods (boxed dinners, canned soups), fried and fast foods. Monitor serving sizes and if the sodium per serving size is more than 200 mg, avoid those foods. If the sodium per serving size is between 100-200 mg, you can use those in limited quantities. Try to choose foods where the amount of sodium per serving size is less than 100 mg. Try to eat a diet rich in fruits and vegetables, whole grains, low fat dairy products, skinless poultry and fish, nuts, beans, non-tropical vegetable oils. Limit saturated fat, trans fat, sodium, red meats, and sugar-sweetened beverages.   Limit alcohol      -The combination of a reduced-calorie diet and increased physical activity is recommended. Adults should aim to get at least 150 minutes of moderate physical activity per week (30 minutes of moderate physical activities at least 5 days per week). Examples of moderate physical activities include brisk walking, swimming, aerobic dancing, heavy gardening, jumping rope, bicycling 10 MPH or faster, tennis, hiking uphill or with a heavy backpack. Please let us know if you would like to learn more about your nutrition and calories and additional  "options including weight loss programs to help you reach your goal.      -If you smoke, stop smoking. If you stop smoking you can help get rid of a major source of stress to your heart. Smoking makes your heart rate and blood pressure go up and increases your risk or developing cardiovascular diseases and worsen symptoms associated with heart failure.      -Obtain a BP monitor and monitor your BP daily. Check it around the same time each day; at least 1 hour after taking your medications. Record your BP in a log and bring your log with you to your doctors appointment.      -F/u with your PCP as recommended.      - If you are having problems with medications, consider looking at the following websites.   -- \"GoodRx\"   -- \"[facility]\"   - We are happy to supply written prescriptions if needed to allow you to obtain your medications from different pharmacies. Additionally, if you are having issues with mail order delivery, please let us know. We can send a limited supply of your medications to your local pharmacy.   "

## 2023-12-28 ENCOUNTER — TELEPHONE (OUTPATIENT)
Dept: PRIMARY CARE | Facility: CLINIC | Age: 53
End: 2023-12-28
Payer: COMMERCIAL

## 2023-12-28 DIAGNOSIS — R94.31 ABNORMAL EKG: ICD-10-CM

## 2023-12-28 DIAGNOSIS — I10 PRIMARY HYPERTENSION: ICD-10-CM

## 2023-12-28 RX ORDER — HYDROCHLOROTHIAZIDE 25 MG/1
25 TABLET ORAL DAILY
Qty: 90 TABLET | Refills: 3 | Status: SHIPPED | OUTPATIENT
Start: 2023-12-28 | End: 2024-12-27

## 2023-12-28 RX ORDER — AMLODIPINE BESYLATE 10 MG/1
10 TABLET ORAL DAILY
Qty: 90 TABLET | Refills: 3 | Status: SHIPPED | OUTPATIENT
Start: 2023-12-28 | End: 2024-12-27

## 2023-12-28 RX ORDER — LISINOPRIL 30 MG/1
30 TABLET ORAL DAILY
Qty: 90 TABLET | Refills: 3 | Status: SHIPPED | OUTPATIENT
Start: 2023-12-28 | End: 2024-04-30 | Stop reason: ALTCHOICE

## 2023-12-28 NOTE — TELEPHONE ENCOUNTER
Result Communication    Resulted Orders   In-Center Sleep Study (Non-Sleep Provider Only)    Narrative    RECORDTYPE: PSG  CPT: 23468 PSG (>=6y)  CSN: [id number]  SCHRECDATE: 2023 19:25:41  LOCATION_CODE: [id number]  PROCDUR: 536.0 min    Sleep Medicine   at [place] [address]  [address] [place] [address]  [phone]    Diagnostic Polysomnography Report    Patient: WINDY DUEÑAS                MRN: [id number]                :   1970  Study Date: 2023                      Height: 156.0 cm             Age:   53  Study Type: PSG; 92851 PSG (>=6y)          Weight: 56.9 kg  BMI: 23.4 Sex:   Female  Referring Clinician: GARRICK BILLINGS  Neck size: 31.0 cm           ESS:       DIAGNOSIS: Obstructive Sleep Apnea, Adult (G47.33)    CMS: no  CLINICAL SUMMARY   Indication: Patient referred for snoring/breathing problems during sleep,   difficulty falling asleep  Past Medical History: hypertension, chronic sinusitis, abnormal EKG  Medications: Amlodipine Besylate, Aspirin 81 mg, Fluticasone Propionate,   Hydrochlorothiazide, Lisinopril, Multivitamin    TECHNICAL OBSERVATIONS / BEHAVIOR SUMMARY   [facility] SLEEP [facility] Patient came to [facility] sleep lab for   a split night PSG. Hookup and procedure was explained to patient.     EEG / SLEEP SUMMARY  The nocturnal sleep study demonstrated acceptable sleep onset and prolonged   REM sleep latency, total sleep time was 489.5 minutes, and the sleep   efficiency was 91.3%. The limited sleep EEG montage demonstrated appropriate   waveforms without epileptiform discharges.  PERIODIC LIMB MOVEMENT SUMMARY  No significant periodic limb movements of sleep were noted during this   diagnostic study.  The periodic limb movement index during sleep was 0.0/hr,   with 0.0% associated with arousals. The periodic limb movement arousal index   during sleep was 0.0/hr. Although there were some technical issues with limb   lead  sensors throughout the night which may impact data accuracy.  RESPIRATORY SUMMARY  This study was performed on room air. The RDI3% was 10.1/hr, RDI4% was 6.0/hr   and BONNY was 2.5/hr.  During sleep, based on RDI3%, the breathing pattern   demonstrated mild sleep disordered breathing, characterized predominantly by   obstructive events.  The mean oxygen saturation was 98.0% during wake and   97.0% during sleep. The oxygen saturation was <= 88% for 0.0 minutes. The   SpO2 larry was 89.0%.  CARDIAC SUMMARY  The mean heart rate during wake was 52 bpm and during sleep was 47 bpm. The   cardiac rhythm demonstrated normal sinus rhythm.     IMPRESSION  Based on the AASM recommended definition, the sleep study is consistent with   a diagnosis of mild obstructive sleep apnea.  The Sp02 larry was 89.0%.   Based on the CMS definition, the sleep study is consistent with a diagnosis   of mild obstructive sleep apnea.  The Sp02 larry was 89.0%.   No significant periodic limb movements of sleep were noted.   Fragmentation of sleep noted during this study appeared to be due to   Repiratory effort related arousals which then led to hypopneas. Snoring was   present more supine and on the right side than on the left side.    RECOMMENDATIONS   There is no consistent evidence of health risk from AHI 5-15/hr (mild MIRTA).   If PAP therapy pursued because of symptoms and/or co-morbidity, can consider   empiric initiation of auto-adjusting CPAP with settings of 4-8 cm H2O with   close clinic follow-up and monitoring of PAP data to ensure control of the   patient's sleep apnea. If necessary, a dedicated titration study may be   considered.   General guidelines for conservative and behavioral management of MIRTA:  â€ƒ1) Consider positional therapy (non-supine sleeping position).  â€ƒ2) Avoid sedating medications, alcohol, and tobacco, if applicable.  â€ƒ3) Evaluation for etiology and management of rhinosinusitis. Maximize nasal   passages with  intranasal steroid spray if needed.   Adequate sleep hygiene (good sleep habits) should be emphasized. Review and   advise on habits of sleep duration, regularity, timing, and environment for   sleep health.   Management of snoring. Nasal steroids or nasalchromyln, examination for   obstruction like polyps, fitness, and repositioning for sleep on the left   side.  See if this helps and reassess in 3-4 months.      FOLLOW-UP  With ordering clinician for further recommendations and management  The study raw data and document was personally reviewed and electronically   signed by:   Dr. ADELITA HANNA MD and Fellow, Evgeny Chew MD  on 11/3/2023 at 11:32 AM  TECHNICAL SUMMARY  This standard diagnostic polysomnogram was performed as per sleep center   protocol. The following channels were recorded: EEG (F3-M2, F4-M1, C3-M2,   C4-M1, O1-M2, O2-M1), EOG (LOC, YARA), chin EMG, thermistor airflow, nasal   pressure transducer airflow, PAP flow, thoracic and abdominal effort channels   by respiratory inductive plethysmography, ECG, limb EMG on bilateral anterior   tibialis, [upper limb leads on bilateral flexor digitorum superficialis,]   pulse oximetry, body position, microphone for snoring, and synchronized   audio-video analysis. Patient was continually attended and monitored by a   registered sleep technologist.  The study was reviewed in full to ensure the   accuracy and quality of the data. Scoring of hypopneas was based on current   AASM guidelines except in the case of CMS related guidelines. For AASM   guidelines, hypopneas were scored when there was 10-second decrement in the   flow limitation by 30% associated with either a 3% O2 desaturation or   arousal. For CMS, hypopneas were scored when there was 10-second decrement in   flow limitation by 30% associated with a 4% O2 desaturation. Respiratory   Effort Related Arousals (RERAs) were scored if there were decrements in flow   limitation not meeting hypopnea  criteria and associated with arousal. The   Respiratory Disturbance Index (RDI) reflects the RERA index + AHI.   ENCOUNTER #: [id number] [facility] #: [id number] SCHEDULE DATE: 2023 19:25:41   LOCATION CODE: [id number]            Diagnostic Polysomnography Data Report  Patient: WINDY DUEÑAS    Age: 53        Study Date: 2023  MRN: [id number]                  Sex: Female    Recording Tech: Farrah Odonnell  : 1970                 BMI: 23.4      Scoring Tech: Mp Frazier    SLEEP ARCHITECTURE SUMMARY  Lights Out Time: 21:26 PM                     Lights on Time: 06:23 AM  Total Recording Time:      536.0 min (8.9 hr) Initial sleep latency:            15.5 min  Total Sleep Time:          489.5 min (8.2 hr) Initial REM latency:              193.0 min  Sleep Efficiency:          91.3%              Wake after sleep onset (WASO):    31.0 min    Sleep Stages     Time (minutes)     % Sleep Time  Wake             46.5               -  Stage N1         19.5               4.0  Stage N2         318.5              65.1  Stage N3         38.0               7.8  REM              113.5               23.2    AROUSAL SUMMARY  Arousal Type     NREM     REM      Sleep                   Count    Index    Count    Index    Count    Index  Total            22       3.5      7        3.7      29       3.6  Spontaneous      7        1.1      3        1.6      10       1.2  Respiratory      15       2.4      4        2.1      19       2.3  Limb Movement    0        0.0      0        0.0      0        0.0    LIMB MOVEMENT SUMMARY                         NREM   REM    Sleep  Wake                         Count  Index  Count  Index  Count  Index  Count  Index  Total LM               0      0.0    0      0.0    0      0.0    0      0.0  Total LM with arousal  0      0.0    0      0.0    0      0.0    0      0.0  PLMS                   0      0.0    0      0.0    0      0.0    0      0.0  PLM with arousal       0      0.0    0       0.0    0      0.0    0      0.0  % PLM with arousal     0.0%   0.0%   0.0%   -      RESPIRATORY SUMMARY                                                        NREM  REM        All Sleep  Sleep time (hours)                                 6.3   1.9        8.2  Respiratory Event Count       Apneas + Hypopneas (3% or arousal)            63    19         82       Apneas + Hypopneas (4% only)                  38    11         49       Respiratory effort related arousals (RERAs)   0     0          0    Respiratory Event Indices       AHI (3% - AASM)                               10.1  10.0       10.1       AHI (4% - CMS)                                6.1   5.8        6.0       RDI (3% - AASM)                               10.1  10.0       10.1       RDI (4%)                                      6.1   5.8        6.0       Apnea Index                                   Index Percentage Index       Percentage Index Percentage            Obstructive Apnea                        0.8   7.9        0.0         0.0        0.6   6.1            Mixed Apnea                              0.0   0.0        0.0         0.0        0.0   0.0            Central Apnea                            2.6   25.4       2.1         21.1       2.5   24.4       Hypopnea Index            Obstructive Hypopnea (3% or arousal)     6.7   66.7       7.9         78.9       7.0   69.5            Obstructive Hypopnea (4% only)           2.7   44.7       3.7         63.6       2.9   49.0            Central Hypopnea                         0.0   0.0        0.0         0.0        0.0   0.0       RERA index                                                     0.0   0.0        0.0         0.0        0.0   0.0  Respiratory Event Average Duration (seconds)       Apnea Average Time                            16.9  16.5       16.9       Hypopnea Average Time                         15.6  15.8       15.7       Apnea time / Total Respiratory Event Time (%) 35.1  21.8        32.1  Oxygen Desaturation Analysis         Wake    NREM    REM     All Sleep       Mean SpO2 (%)                   98.0    97.0    98.0    97.0       Minimum SpO2 (%)                92.0    89.0    91.0    89.0         Time below SpO2 < 90% (min)     0.0     0.1     0.0     0.1       Time below SpO2 <= 88% (min)    0.0     0.0     0.0     0.0         ZEHRA 3% (per h)                  -       6.7     6.9     6.7       ZEHRA 4% (per h)                  -       3.8     3.7     3.8  Body Position Effects   NREM     REM     All Sleep                          Supine   Other   Supine      Other   Supine   Other       Sleep Time (min)   182.5    193.5   38.0        75.5    220.5    269.0       AHI (AASM)         12.8     7.4     22.1        4.0     14.4     6.5       AHI (CMS)          7.9      4.3     12.6        2.4     8.7      3.8       RDI (AASM)         12.8     7.4     22.1        4.0     14.4     6.5       RDI (4%)           7.9      4.3     12.6        2.4     8.7      3.8       ZEHRA 3%             10.2     3.4     20.5        0.0     12.0     2.5       ZEHRA 4%             6.2      1.6     11.1        0.0     7.1      1.1        CARDIAC SUMMARY  CARDIAC SUMMARY    Heart rate (bpm)          Wake    NREM    REM    All Sleep      Mean Heart Rate       52      47      47     47      Minimum Heart Rate    43      40      39     39      Maximum Heart Rate    73      86      67     86  CARBON DIOXIDE / OXIMETRY MONITORING SUMMARY  SpO2 Summary                       Wake    NREM    REM  Mean %               98.0    97.0    98.0  Time < 90% (min)     0.0     0.1     0.0  Time <= 88% (min)    0.0     0.0     0.0  Time < 85% (min)     0.0     0.0     0.0  Time < 80% (min)     0.0     0.0     0.0  EtCO2 Summary                     Wake    NREM    REM  Mean               0.0     0.0     0.0  High               0       0       0  Low                0       0       0  Time > 55 mmHg     0.0     0.0     0.0  Time > 50 mmHg     0.0      0.0     0.0  %Time > 50 mmHg    0.0     0.0     0.0  TcCO2 Summary                     Wake    NREM    REM  Mean               0.0     0.0     0.0  High               0       0       0  Low                0       0       0  Time > 55 mmHg     0.0     0.0     0.0  Time > 50 mmHg     0.0     0.0     0.0  %Time > 50 mmHg    0.0     0.0     0.0      HYPNOGRAM        ----------  Report Digitally Signed By:  ADELITA HANNA MD (11/3/2023 12:08:07 PM)       5:14 PM    Discussed with patient   Discussed  mild MIRTA and positioning   Requests refill on meds

## 2024-01-04 ENCOUNTER — OFFICE VISIT (OUTPATIENT)
Dept: PRIMARY CARE | Facility: CLINIC | Age: 54
End: 2024-01-04
Payer: COMMERCIAL

## 2024-01-04 VITALS
DIASTOLIC BLOOD PRESSURE: 71 MMHG | HEART RATE: 60 BPM | BODY MASS INDEX: 23.74 KG/M2 | RESPIRATION RATE: 16 BRPM | HEIGHT: 62 IN | WEIGHT: 129 LBS | TEMPERATURE: 98 F | OXYGEN SATURATION: 98 % | SYSTOLIC BLOOD PRESSURE: 158 MMHG

## 2024-01-04 DIAGNOSIS — R21 RASH: Primary | ICD-10-CM

## 2024-01-04 PROCEDURE — 3078F DIAST BP <80 MM HG: CPT | Performed by: STUDENT IN AN ORGANIZED HEALTH CARE EDUCATION/TRAINING PROGRAM

## 2024-01-04 PROCEDURE — 1036F TOBACCO NON-USER: CPT | Performed by: STUDENT IN AN ORGANIZED HEALTH CARE EDUCATION/TRAINING PROGRAM

## 2024-01-04 PROCEDURE — 3077F SYST BP >= 140 MM HG: CPT | Performed by: STUDENT IN AN ORGANIZED HEALTH CARE EDUCATION/TRAINING PROGRAM

## 2024-01-04 PROCEDURE — 99213 OFFICE O/P EST LOW 20 MIN: CPT | Performed by: STUDENT IN AN ORGANIZED HEALTH CARE EDUCATION/TRAINING PROGRAM

## 2024-01-04 ASSESSMENT — ENCOUNTER SYMPTOMS
DYSURIA: 0
DIARRHEA: 0
VOMITING: 0
NUMBNESS: 0
NAUSEA: 0
ANOREXIA: 0
ABDOMINAL PAIN: 0
DIZZINESS: 0
WEAKNESS: 0
EYE PAIN: 0
CONSTIPATION: 0
SHORTNESS OF BREATH: 0
SORE THROAT: 0
NAIL CHANGES: 0
FEVER: 0
COUGH: 0
FATIGUE: 0
RHINORRHEA: 0
WHEEZING: 0
ACTIVITY CHANGE: 0
SPEECH DIFFICULTY: 0
HEMATURIA: 0

## 2024-01-04 NOTE — LETTER
January 4, 2024     Patient: Trena Yee   YOB: 1970   Date of Visit: 1/4/2024       To Whom It May Concern:    Trena Yee was seen in my clinic on 1/4/2024 at 11:40 am. Please excuse Trena for her absence from work on this day to make the appointment.    If you have any questions or concerns, please don't hesitate to call.         Sincerely,         Bee Almazan MD        CC: No Recipients

## 2024-01-04 NOTE — PROGRESS NOTES
Subjective   Patient ID: Trena Yee is a 53 y.o. female who presents for rash and follow up     Rash  This is a new problem. The current episode started yesterday. The problem has been rapidly worsening since onset. The affected locations include the back. The rash is characterized by redness and itchiness. She was exposed to a new detergent/soap. Pertinent negatives include no anorexia, congestion, cough, diarrhea, eye pain, facial edema, fatigue, fever, joint pain, nail changes, rhinorrhea, shortness of breath, sore throat or vomiting.     Rash   That started today.  And itching started yesterday.  Denies any pain.  Had chickenpox as a child.  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    Past Medical History:   Diagnosis Date    Personal history of other diseases of the nervous system and sense organs     History of sleep apnea      Past Surgical History:   Procedure Laterality Date    MOUTH SURGERY  05/24/2016    Oral Surgery Tooth Extraction      Family History   Problem Relation Name Age of Onset    No Known Problems Mother      Diabetes Father      Sleep apnea Son      Diabetes Maternal Grandmother      Diabetes Paternal Grandmother      ALS Paternal Grandmother      Prostate cancer Paternal Grandfather        No Known Allergies       Occupation:     Review of Systems   Constitutional:  Negative for activity change, fatigue and fever.   HENT:  Negative for congestion, rhinorrhea and sore throat.    Eyes:  Negative for pain.   Respiratory:  Negative for cough, shortness of breath and wheezing.    Cardiovascular:  Negative for chest pain and leg swelling.   Gastrointestinal:  Negative for abdominal pain, anorexia, constipation, diarrhea, nausea and vomiting.    Endocrine: Negative for cold intolerance.   Genitourinary:  Negative for dysuria, hematuria and urgency.   Musculoskeletal:  Negative for joint pain.   Skin:  Positive for rash. Negative for nail changes.   Neurological:  Negative for dizziness, speech difficulty, weakness and numbness.   Psychiatric/Behavioral:  Negative for self-injury and suicidal ideas.        Objective   Visit Vitals  Smoking Status Never      Physical Exam  Constitutional:       Appearance: Normal appearance.   HENT:      Head: Normocephalic and atraumatic.      Nose: Nose normal.      Mouth/Throat:      Mouth: Mucous membranes are moist.   Eyes:      Conjunctiva/sclera: Conjunctivae normal.      Pupils: Pupils are equal, round, and reactive to light.   Cardiovascular:      Rate and Rhythm: Normal rate and regular rhythm.      Pulses: Normal pulses.      Heart sounds: Normal heart sounds.   Pulmonary:      Effort: Pulmonary effort is normal.      Breath sounds: Normal breath sounds.   Musculoskeletal:         General: Normal range of motion.      Cervical back: Neck supple.   Skin:     General: Skin is warm.      Comments: 5-6 papules less than 0.5 mm present on the right chest along patient's previous scar.  Mild erythema surrounding the papule.  No vesicles present   Neurological:      General: No focal deficit present.      Mental Status: She is alert and oriented to person, place, and time.   Psychiatric:         Mood and Affect: Mood normal.         Behavior: Behavior normal.         Thought Content: Thought content normal.         Judgment: Judgment normal.         Assessment/Plan   Diagnoses and all orders for this visit:  Rash  Rash-unsure of the cause at present.  Possibly developing shingles.  Patient does report itching but no pain.  No travel history reported.  No sick contacts.  We discussed on differential diagnosis including shingles, eczema.  We discussed it could be possibly developing shingles and I could prescribe the  "treatment which is very effective when taken within 3 days of symptom onset.  I discussed we can also prescribe anti-itch medications including Benadryl, hydroxyzine, Allegra and we described the side effects including dizziness and drowsiness and caution to be taken when patient is driving.  Patient reports \"I do not have time to take off work and want to make sure that this is shingles before I take the medication\" we discussed on possible work excuse versus FMLA form if this turns out to be shingles.  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  We also discussed about dermatology referral with patient declines at this time   "

## 2024-01-04 NOTE — PROGRESS NOTES
Answers submitted by the patient for this visit:  Rash Questionnaire (Submitted on 1/4/2024)  Chief Complaint: Rash  Chronicity: new  Onset: yesterday  Progression since onset: rapidly worsening  Affected locations: back  Characteristics: redness, itchiness  Exposed to: a new detergent/soap  anorexia: No  congestion: No  cough: No  diarrhea: No  eye pain: No  facial edema: No  fatigue: No  fever: No  joint pain: No  nail changes: No  rhinorrhea: No  shortness of breath: No  sore throat: No  vomiting: No

## 2024-01-05 ENCOUNTER — APPOINTMENT (OUTPATIENT)
Dept: PRIMARY CARE | Facility: CLINIC | Age: 54
End: 2024-01-05
Payer: COMMERCIAL

## 2024-01-10 ASSESSMENT — DERMATOLOGY QUALITY OF LIFE (QOL) ASSESSMENT
RATE HOW BOTHERED YOU ARE BY SYMPTOMS OF YOUR SKIN PROBLEM (EG, ITCHING, STINGING BURNING, HURTING OR SKIN IRRITATION): 4
RATE HOW EMOTIONALLY BOTHERED YOU ARE BY YOUR SKIN PROBLEM (FOR EXAMPLE, WORRY, EMBARRASSMENT, FRUSTRATION): 5
RATE HOW EMOTIONALLY BOTHERED YOU ARE BY YOUR SKIN PROBLEM (FOR EXAMPLE, WORRY, EMBARRASSMENT, FRUSTRATION): 5
RATE HOW BOTHERED YOU ARE BY EFFECTS OF YOUR SKIN PROBLEMS ON YOUR ACTIVITIES (EG, GOING OUT, ACCOMPLISHING WHAT YOU WANT, WORK ACTIVITIES OR YOUR RELATIONSHIPS WITH OTHERS): 4
RATE HOW BOTHERED YOU ARE BY EFFECTS OF YOUR SKIN PROBLEMS ON YOUR ACTIVITIES (EG, GOING OUT, ACCOMPLISHING WHAT YOU WANT, WORK ACTIVITIES OR YOUR RELATIONSHIPS WITH OTHERS): 4
WHAT SINGLE SKIN CONDITION LISTED BELOW IS THE PATIENT ANSWERING THE QUALITY-OF-LIFE ASSESSMENT QUESTIONS ABOUT: NONE OF THE ABOVE
RATE HOW BOTHERED YOU ARE BY SYMPTOMS OF YOUR SKIN PROBLEM (EG, ITCHING, STINGING BURNING, HURTING OR SKIN IRRITATION): 4
WHAT SINGLE SKIN CONDITION LISTED BELOW IS THE PATIENT ANSWERING THE QUALITY-OF-LIFE ASSESSMENT QUESTIONS ABOUT: NONE OF THE ABOVE

## 2024-01-12 ENCOUNTER — OFFICE VISIT (OUTPATIENT)
Dept: DERMATOLOGY | Facility: CLINIC | Age: 54
End: 2024-01-12
Payer: COMMERCIAL

## 2024-01-12 DIAGNOSIS — L30.9 DERMATITIS: Primary | ICD-10-CM

## 2024-01-12 PROCEDURE — 99203 OFFICE O/P NEW LOW 30 MIN: CPT | Performed by: STUDENT IN AN ORGANIZED HEALTH CARE EDUCATION/TRAINING PROGRAM

## 2024-01-12 PROCEDURE — 1036F TOBACCO NON-USER: CPT | Performed by: STUDENT IN AN ORGANIZED HEALTH CARE EDUCATION/TRAINING PROGRAM

## 2024-01-12 RX ORDER — TRIAMCINOLONE ACETONIDE 1 MG/G
OINTMENT TOPICAL DAILY
Qty: 30 G | Refills: 1 | Status: SHIPPED | OUTPATIENT
Start: 2024-01-12 | End: 2024-01-12 | Stop reason: SDUPTHER

## 2024-01-12 RX ORDER — TRIAMCINOLONE ACETONIDE 1 MG/G
OINTMENT TOPICAL DAILY
Qty: 30 G | Refills: 1 | Status: SHIPPED | OUTPATIENT
Start: 2024-01-12

## 2024-01-12 ASSESSMENT — ITCH NUMERIC RATING SCALE: HOW SEVERE IS YOUR ITCHING?: 9

## 2024-01-12 NOTE — PROGRESS NOTES
Subjective     Trena Yee is a 53 y.o. female who presents for the following: Rash (Under side of right breast, very itchy but not painful).     Review of Systems:  No other skin or systemic complaints other than what is documented elsewhere in the note.    The following portions of the chart were reviewed this encounter and updated as appropriate:          Skin Cancer History  No skin cancer on file.      Specialty Problems    None       Objective   Well appearing patient in no apparent distress; mood and affect are within normal limits.    A focused skin examination was performed. All findings within normal limits unless otherwise noted below.    Assessment/Plan

## 2024-02-01 ENCOUNTER — HOSPITAL ENCOUNTER (OUTPATIENT)
Dept: RADIOLOGY | Facility: CLINIC | Age: 54
Discharge: HOME | End: 2024-02-01
Payer: COMMERCIAL

## 2024-02-01 DIAGNOSIS — Z12.31 SCREENING MAMMOGRAM FOR BREAST CANCER: ICD-10-CM

## 2024-02-01 PROCEDURE — 77067 SCR MAMMO BI INCL CAD: CPT

## 2024-02-01 PROCEDURE — 77067 SCR MAMMO BI INCL CAD: CPT | Performed by: RADIOLOGY

## 2024-02-01 PROCEDURE — 77063 BREAST TOMOSYNTHESIS BI: CPT | Performed by: RADIOLOGY

## 2024-02-14 ENCOUNTER — HOSPITAL ENCOUNTER (OUTPATIENT)
Dept: RADIOLOGY | Facility: EXTERNAL LOCATION | Age: 54
Discharge: HOME | End: 2024-02-14

## 2024-03-29 ENCOUNTER — APPOINTMENT (OUTPATIENT)
Dept: CARDIOLOGY | Facility: CLINIC | Age: 54
End: 2024-03-29
Payer: COMMERCIAL

## 2024-04-22 ENCOUNTER — APPOINTMENT (OUTPATIENT)
Dept: CARDIOLOGY | Facility: CLINIC | Age: 54
End: 2024-04-22
Payer: COMMERCIAL

## 2024-04-30 ENCOUNTER — OFFICE VISIT (OUTPATIENT)
Dept: CARDIOLOGY | Facility: CLINIC | Age: 54
End: 2024-04-30
Payer: COMMERCIAL

## 2024-04-30 VITALS
HEART RATE: 49 BPM | OXYGEN SATURATION: 93 % | WEIGHT: 123 LBS | HEIGHT: 62 IN | SYSTOLIC BLOOD PRESSURE: 176 MMHG | DIASTOLIC BLOOD PRESSURE: 105 MMHG | BODY MASS INDEX: 22.63 KG/M2

## 2024-04-30 DIAGNOSIS — E78.5 BORDERLINE HYPERLIPIDEMIA: ICD-10-CM

## 2024-04-30 DIAGNOSIS — I10 PRIMARY HYPERTENSION: ICD-10-CM

## 2024-04-30 DIAGNOSIS — R94.31 ABNORMAL EKG: Primary | ICD-10-CM

## 2024-04-30 PROCEDURE — 93010 ELECTROCARDIOGRAM REPORT: CPT | Performed by: INTERNAL MEDICINE

## 2024-04-30 PROCEDURE — 99214 OFFICE O/P EST MOD 30 MIN: CPT | Performed by: INTERNAL MEDICINE

## 2024-04-30 PROCEDURE — 3077F SYST BP >= 140 MM HG: CPT | Performed by: INTERNAL MEDICINE

## 2024-04-30 PROCEDURE — 93005 ELECTROCARDIOGRAM TRACING: CPT | Performed by: INTERNAL MEDICINE

## 2024-04-30 PROCEDURE — 3080F DIAST BP >= 90 MM HG: CPT | Performed by: INTERNAL MEDICINE

## 2024-04-30 RX ORDER — OLMESARTAN MEDOXOMIL 20 MG/1
20 TABLET ORAL DAILY
Qty: 90 TABLET | Refills: 3 | Status: SHIPPED | OUTPATIENT
Start: 2024-04-30 | End: 2025-04-30

## 2024-04-30 RX ORDER — CALCIUM CARBONATE 750 MG/1
1 TABLET, CHEWABLE ORAL 3 TIMES DAILY
Qty: 1 KIT | Refills: 0 | Status: SHIPPED | OUTPATIENT
Start: 2024-04-30 | End: 2025-04-30

## 2024-04-30 ASSESSMENT — ENCOUNTER SYMPTOMS
LOSS OF SENSATION IN FEET: 0
DEPRESSION: 0
OCCASIONAL FEELINGS OF UNSTEADINESS: 0

## 2024-04-30 ASSESSMENT — PAIN SCALES - GENERAL: PAINLEVEL: 0-NO PAIN

## 2024-04-30 NOTE — PATIENT INSTRUCTIONS
"It was nice to meet you today. We will see each other again in 4-6 months     Your blood pressure regimen is as follows   We will Stop Lisinopril  Due to you cough     We will start Olmesartan 20 mg Daily to help address your blood pressure   ++ Please use hydrochlorothiazide  25 mg Daily   ++ Please use  Amlodipine 10 mg Daily      Please check your blood pressure 2-3 times daily. Please bring your readings and your dedvice to your next appointment.   Try to make sure that you are checking your blood pressure about 2 hours AFTER tyou take your medications.      PLEASE STOP SMOKING.. PERIOD.. FULL STOP    We talked about your Sleep Study revealing MILD SLEEP APNEA  -- You would get better sleep if you reduced you alcohol, tobacco and marijuana use.   -- By stopping this, this could get better.       Below are some Heart Health Tips that we provide to all of our patients. I hope you find them useful.      Please stop smoking.    --Try to cut back on the number of cigarettes that you smoke.   -- Try to increase the interval between cigarettes.   -- Try to use substitutes such as sugar-free candy carrots,celery sticks as in between.  -- Once you are down to less than half a pack a day try to go cold turkey.   -- Try to designate areas in the your home as smoke-free areas.   -- Try to reduce the number of ashtrays and other reminders of smoking.   -- Try to keep any major something that you dislike next to your cigarette pack to try to develop a mental aversion to smoking     -DASH stands for \"dietary approaches to stop hypertension.\" The DASH diet is low in total and saturated fat. It is rich in fruits, vegetables, and low-fat dairy foods. The diet allows you to get natural fiber, calcium, and magnesium from food. It prevents or lowers high blood pressure. It can also help lower cholesterol in your blood. Don't change how you eat all at once. It's much more likely that you'll succeed if you make only one or two small " changes at a time. Wait until those changes are a habit, then make a couple more changes. Some good starting steps include: Add one serving of vegetables to your meals at lunch and dinner. This is an easy way to help you get more vegetables in your diet. Have a piece of fruit as an afternoon or after-dinner snack. One glass of juice at breakfast is not enough fruit in your diet. Use half your usual amount of butter, margarine, or salad dressing. Buy nonfat salad dressing or nonfat sour cream. Follow this guide to select your menu of meals. The number of calories we want you to eat each day will tell you how many servings you can choose from each food group.     - We recommend you follow a heart healthy diet. Watch food labels and try not to eat more than 2,500 mg of sodium per day. Avoid foods high in salt like processed meats (lunch meats, hudson, and sausage), processed foods (boxed dinners, canned soups), fried and fast foods. Monitor serving sizes and if the sodium per serving size is more than 200 mg, avoid those foods. If the sodium per serving size is between 100-200 mg, you can use those in limited quantities. Try to choose foods where the amount of sodium per serving size is less than 100 mg. Try to eat a diet rich in fruits and vegetables, whole grains, low fat dairy products, skinless poultry and fish, nuts, beans, non-tropical vegetable oils. Limit saturated fat, trans fat, sodium, red meats, and sugar-sweetened beverages.   Limit alcohol      -The combination of a reduced-calorie diet and increased physical activity is recommended. Adults should aim to get at least 150 minutes of moderate physical activity per week (30 minutes of moderate physical activities at least 5 days per week). Examples of moderate physical activities include brisk walking, swimming, aerobic dancing, heavy gardening, jumping rope, bicycling 10 MPH or faster, tennis, hiking uphill or with a heavy backpack. Please let us know if you  "would like to learn more about your nutrition and calories and additional options including weight loss programs to help you reach your goal.      -If you smoke, stop smoking. If you stop smoking you can help get rid of a major source of stress to your heart. Smoking makes your heart rate and blood pressure go up and increases your risk or developing cardiovascular diseases and worsen symptoms associated with heart failure.      -Obtain a BP monitor and monitor your BP daily. Check it around the same time each day; at least 1 hour after taking your medications. Record your BP in a log and bring your log with you to your doctors appointment.      -F/u with your PCP as recommended.      - If you are having problems with medications, consider looking at the following websites.   -- \"GoodRx\"   -- \"[facility]\"   - We are happy to supply written prescriptions if needed to allow you to obtain your medications from different pharmacies. Additionally, if you are having issues with mail order delivery, please let us know. We can send a limited supply of your medications to your local pharmacy.   "

## 2024-04-30 NOTE — PROGRESS NOTES
"Chief Complaint:   Follow-up (5 month)     History Of Present Illness:    Trena Yee is a 53 y.o. female presenting with  a pertinent medical history notable for poorly controlled essential hypertension, obstructive sleep apnea, dyslipidemia who was referred to cardiology for abnormal EKG in the setting of left anterior fascicular block      11/30/2023 -- She presents for follow up. She has been trying to follow th e dash diet and is adjusting . She otherwise remains stable. She offers no complaints.     14/30/2024-- She returns for follow up. She notes that she has a cough, that is chronic in nature she \"spoke to a lot of RN friends and they said it was the lisinopril. I don't like it\"      Patient denies chest pain and angina. Pt denies shortness of breath, dyspnea on exertion, orthopnea, and paroxysmal nocturnal dyspnea. Pt denies worsening lower extremity edema. Pt denies palpitations or syncope. No recent falls. No fever or chills. No cough. No change in bowel or bladder habits. No travel. No sick contacts. No recent travel     12 point review of systems was performed and is otherwise negative.  Past medical history: As above.  Medications: Reviewed.  Allergies: Reviewed.  Social history: Patient denies smoking, alcohol abuse, or illicit drug use. She does admit to continued marijuana use  Family history: No sudden cardiac death or premature coronary artery disease..     Last Recorded Vitals:  Vitals:    04/30/24 0839   BP: (!) 176/105   BP Location: Right arm   Patient Position: Sitting   Pulse: (!) 49   SpO2: 93%   Weight: 55.8 kg (123 lb)   Height: 1.575 m (5' 2\")         Past Medical History:  She has a past medical history of Personal history of other diseases of the nervous system and sense organs.    Past Surgical History:  She has a past surgical history that includes Mouth surgery (05/24/2016) and Hysterectomy.      Social History:  She reports that she has never smoked. She has never used " smokeless tobacco. She reports current drug use. Drug: Marijuana. No history on file for alcohol use.    Family History:  Family History   Problem Relation Name Age of Onset    No Known Problems Mother      Diabetes Father      Sleep apnea Son      Diabetes Maternal Grandmother      Diabetes Paternal Grandmother      ALS Paternal Grandmother      Prostate cancer Paternal Grandfather          Allergies:  Ace inhibitors    Outpatient Medications:  Current Outpatient Medications   Medication Instructions    amLODIPine (NORVASC) 10 mg, oral, Daily    fluticasone (Flonase) 50 mcg/actuation nasal spray 1 spray, Each Nostril, Every 12 hours    hydroCHLOROthiazide (HYDRODIURIL) 25 mg, oral, Daily    multivitamin-Ca-iron-minerals (Tab-A-Charu Womens) 27-0.4 mg tablet 1 tablet, oral, Daily    triamcinolone (Kenalog) 0.1 % ointment Topical, Daily, Use tiny amount for one week if re-occurs under the breast       Physical Exam:  Vitals and nursing note reviewed.   Constitutional:       Appearance: Healthy appearance.   Eyes:      Pupils: Pupils are equal, round, and reactive to light.   HENT:    Mouth/Throat:      Pharynx: Oropharynx is clear.   Neck:      Vascular: JVD normal.      Lymphadenopathy: No cervical adenopathy.   Pulmonary:      Effort: Pulmonary effort is normal.      Breath sounds: Normal breath sounds. No wheezing. No rhonchi.   Cardiovascular:      Normal rate. Regular rhythm. Normal S1. Normal S2.       Murmurs: There is no murmur.      No click.   Pulses:     Intact distal pulses.   Musculoskeletal:      Cervical back: Normal range of motion.          Last Labs:  CBC -  Lab Results   Component Value Date    WBC 5.8 07/18/2023    HGB 13.6 07/18/2023    HCT 42.8 07/18/2023    MCV 84 07/18/2023     07/18/2023       CMP -  Lab Results   Component Value Date    CALCIUM 9.7 09/06/2023    PHOS 3.7 09/06/2023    PROT 7.0 07/18/2023    ALBUMIN 4.4 09/06/2023    AST 19 07/18/2023    ALT 11 07/18/2023    ALKPHOS 77  "07/18/2023    BILITOT 0.7 07/18/2023       LIPID PANEL -   Lab Results   Component Value Date    CHOL 233 (H) 09/06/2023    TRIG 84 09/06/2023    HDL 99.0 09/06/2023    CHHDL 2.4 09/06/2023    LDLF 97 07/18/2023    VLDL 14 07/18/2023       RENAL FUNCTION PANEL -   Lab Results   Component Value Date    GLUCOSE 75 09/06/2023     09/06/2023    K 3.5 09/06/2023     09/06/2023    CO2 29 09/06/2023    ANIONGAP 12 09/06/2023    BUN 18 09/06/2023    CREATININE 0.82 09/06/2023    CALCIUM 9.7 09/06/2023    PHOS 3.7 09/06/2023    ALBUMIN 4.4 09/06/2023        Lab Results   Component Value Date    BNP 32 09/06/2023    HGBA1C 5.6 10/13/2017       Last Cardiology Tests:  ECG:   In Office EKG 04/30/2024   --   In Office EKG 11/30/2023 -- Sinus Bradycardia       Echo: 08/31/2023  1. Left ventricular systolic function is normal with a 55-60% estimated ejection fraction.  2. Mild to moderate tricuspid regurgitation visualized.  3. RVSP within normal limits.      Lab review: I have personally reviewed the laboratory result(s)   Diagnostic review: I have personally reviewed the result(s) of the EKG and Echocardiogram .     Assessment/Plan   Problem List Items Addressed This Visit       Abnormal EKG - Primary    Overview     The 10-year ASCVD risk score (Fabienne HASTINGS, et al., 2019) is: 5.5%    Values used to calculate the score:      Age: 53 years      Sex: Female      Is Non- : Yes      Diabetic: No      Tobacco smoker: No      Systolic Blood Pressure: 158 mmHg      Is BP treated: Yes      HDL Cholesterol: 99 mg/dL      Total Cholesterol: 233 mg/dL    Lab Results   Component Value Date    CHOL 233 (H) 09/06/2023    CHOL 211 (H) 07/18/2023    CHOL 203 (H) 07/13/2022     Lab Results   Component Value Date    HDL 99.0 09/06/2023    HDL 99.9 07/18/2023    HDL 95.9 07/13/2022     No results found for: \"LDLCALC\"  Lab Results   Component Value Date    TRIG 84 09/06/2023    TRIG 71 07/18/2023    TRIG 142 " "07/13/2022     No components found for: \"CHOLHDL\"             Borderline hyperlipidemia    Overview     The 10-year ASCVD risk score (Fabienne HASTINGS, et al., 2019) is: 5.6%    Values used to calculate the score:      Age: 53 years      Sex: Female      Is Non- : Yes      Diabetic: No      Tobacco smoker: No      Systolic Blood Pressure: 159 mmHg      Is BP treated: Yes      HDL Cholesterol: 99 mg/dL      Total Cholesterol: 233 mg/dL  Lab Results   Component Value Date    CHOL 233 (H) 09/06/2023    CHOL 211 (H) 07/18/2023    CHOL 203 (H) 07/13/2022     Lab Results   Component Value Date    HDL 99.0 09/06/2023    HDL 99.9 07/18/2023    HDL 95.9 07/13/2022     No results found for: \"LDLCALC\"  Lab Results   Component Value Date    TRIG 84 09/06/2023    TRIG 71 07/18/2023    TRIG 142 07/13/2022     No components found for: \"CHOLHDL\"           Current Assessment & Plan     Continue with lifestyle modification   Reassess--   -- Low threshold for starting cholesterol medications           Hypertension    Overview     Poorly controlled essential hypertension, especially today she is not taking any medications as of yet           Current Assessment & Plan     --Directed her to take home medications as soon as she gets home  --Will stop lisinopril secondary to cough  --Switch to olmesartan 20 mg daily  -- Continue hydrochlorothiazide 25 mg  Home blood pressure monitoring--                   Bernabe Vargas, DO  "

## 2024-04-30 NOTE — ASSESSMENT & PLAN NOTE
--Directed her to take home medications as soon as she gets home  --Will stop lisinopril secondary to cough  --Switch to olmesartan 20 mg daily  -- Continue hydrochlorothiazide 25 mg  Home blood pressure monitoring--

## 2024-04-30 NOTE — ASSESSMENT & PLAN NOTE
Continue with lifestyle modification   Reassess--   -- Low threshold for starting cholesterol medications

## 2024-05-03 DIAGNOSIS — Z12.11 ENCOUNTER FOR SCREENING COLONOSCOPY: ICD-10-CM

## 2024-05-09 LAB
ATRIAL RATE: 49 BPM
P AXIS: 30 DEGREES
P OFFSET: 215 MS
P ONSET: 160 MS
PR INTERVAL: 140 MS
Q ONSET: 230 MS
QRS COUNT: 8 BEATS
QRS DURATION: 108 MS
QT INTERVAL: 456 MS
QTC CALCULATION(BAZETT): 411 MS
QTC FREDERICIA: 426 MS
R AXIS: -45 DEGREES
T AXIS: 6 DEGREES
T OFFSET: 458 MS
VENTRICULAR RATE: 49 BPM

## 2024-05-29 ENCOUNTER — APPOINTMENT (OUTPATIENT)
Dept: PRIMARY CARE | Facility: CLINIC | Age: 54
End: 2024-05-29
Payer: COMMERCIAL

## 2024-07-09 ENCOUNTER — APPOINTMENT (OUTPATIENT)
Dept: PRIMARY CARE | Facility: CLINIC | Age: 54
End: 2024-07-09
Payer: COMMERCIAL

## 2024-08-13 ENCOUNTER — APPOINTMENT (OUTPATIENT)
Dept: PRIMARY CARE | Facility: CLINIC | Age: 54
End: 2024-08-13
Payer: COMMERCIAL

## 2025-01-28 ENCOUNTER — APPOINTMENT (OUTPATIENT)
Dept: CARDIOLOGY | Facility: CLINIC | Age: 55
End: 2025-01-28
Payer: COMMERCIAL